# Patient Record
Sex: FEMALE | Race: WHITE | NOT HISPANIC OR LATINO | Employment: OTHER | URBAN - METROPOLITAN AREA
[De-identification: names, ages, dates, MRNs, and addresses within clinical notes are randomized per-mention and may not be internally consistent; named-entity substitution may affect disease eponyms.]

---

## 2017-01-17 NOTE — PATIENT DISCUSSION
Today's exam, diagnostic studies, and/or review of records show NO SIGNIFICANT CHANGE from previous exams.

## 2017-01-17 NOTE — PATIENT DISCUSSION
Post-op instructions given. Discussed drops, positioning, no strenuous activity and eye protection. Call immediately if eye pain or loss of vision.

## 2017-02-16 NOTE — PATIENT DISCUSSION
Recommended VITRECTOMY, endolaser, SILICONE OIL INJECTION. Discussed benefits, alternatives, and risks of surgery including (but not limited to) cataract (if patient has natural lens), infection, bleeding, redetachment, optic neuropathy, loss of vision, blindness, and loss of eye. Patient understands more than one operation may be needed to repair retinal detachment.

## 2017-02-17 NOTE — PATIENT DISCUSSION
Patient to start EYE DROPS in the operative eye; Pred- Forte/Prednisolone Acetate 1% & Antibiotic eye drops 4 TIMES A DAY FOR ONE WEEK, then reduce them to 2 TIMES A DAY FOR ONE WEEK, then stop the drops. Wait at least one minute between drops.

## 2017-04-11 NOTE — PATIENT DISCUSSION
Recommended VITRECTOMY, endolaser, FLUID/GAS EXCHANGE. Discussed benefits, alternatives, and risks of surgery including (but not limited to) cataract (if patient has natural lens), glaucoma, infection, bleeding, redetachment, optic neuropathy, loss of vision, blindness, and loss of eye. Patient understands more than one operation may be needed to repair retinal detachment.

## 2017-08-15 NOTE — PROCEDURE NOTE: CLINICAL
PROCEDURE NOTE: Avastin 1.25mg #1 OD. Diagnosis: Neovascular Glaucoma. Anesthesia: Topical/Subconjunctival. Prep: Betadine Drops. Prior to injection, risks/benefits/alternatives discussed including infection, loss of vision, hemorrhage, cataract, glaucoma, retinal tears or detachment. The off-label status of Intravitreal Avastin also was reviewed. The patient wished to proceed with treatment. Topical anesthesia was induced with Alcaine. Additional anesthesia was achieved using drop(s) or injection checked above. A drop of Povidone-iodine 5% ophthalmic solution was instilled over the injection site and in the inferior fornix. Betadine prep was performed. Using the syringe provided, Avastin 1.25 mg in 0.05 cc was injected into the vitreous cavity. The needle was passed 3.0 mm posterior to the limbus in pseudophakic patients, and 3.5 mm posterior to the limbus in phakic patients. Patient tolerated procedure well. There were no complications. Injection time: 9:59AM. Lot #: [email or web address]. Expiration Date: Wed Aug 23 2017 00:00:00 T-0400 Dillon Mathews Daylight Time). Inventory Id: null. Post-op instructions given. The patient was instructed to return for re-evaluation in approximately 4-12 weeks depending on his/her condition and was told to call immediately if vision decreases and/or if his/her eye becomes red, painful, and/or light sensitive. The patient was instructed to go to the emergency room or call 911 if unable to reach the doctor within an hour or two of trying or calling. The patient was instructed to use Artificial Tears q.i.d. p.r.n for comfort. Alberto Whalen

## 2017-09-22 NOTE — PROCEDURE NOTE: CLINICAL
PROCEDURE NOTE: Avastin 1.25mg OD. Diagnosis: Cystoid Macular Edema. Anesthesia: Topical/Subconjunctival. Prep: Betadine Drops. Prior to injection, risks/benefits/alternatives discussed including infection, loss of vision, hemorrhage, cataract, glaucoma, retinal tears or detachment. The off-label status of Intravitreal Avastin also was reviewed. The patient wished to proceed with treatment. Topical anesthesia was induced with Alcaine. Additional anesthesia was achieved using drop(s) or injection checked above. A drop of Povidone-iodine 5% ophthalmic solution was instilled over the injection site and in the inferior fornix. Betadine prep was performed. Using the syringe provided, Avastin 1.25 mg in 0.05 cc was injected into the vitreous cavity. The needle was passed 3.0 mm posterior to the limbus in pseudophakic patients, and 3.5 mm posterior to the limbus in phakic patients. Patient tolerated procedure well. There were no complications. Injection time: 9:05. Lot #: [email or web address]. Expiration Date: Sun Oct 29 2017 00:00:00 [facility]-0400 Dillon Emery Daylight Time). Inventory Id: null. Post-op instructions given. The patient was instructed to return for re-evaluation in approximately 4-12 weeks depending on his/her condition and was told to call immediately if vision decreases and/or if his/her eye becomes red, painful, and/or light sensitive. The patient was instructed to go to the emergency room or call 911 if unable to reach the doctor within an hour or two of trying or calling. The patient was instructed to use Artificial Tears q.i.d. p.r.n for comfort. Alberto Whalen

## 2017-11-21 NOTE — PROCEDURE NOTE: CLINICAL
PROCEDURE NOTE: Avastin 1.25mg OD. Diagnosis: Neovascular Glaucoma. Anesthesia: Topical/Subconjunctival. Prep: Betadine Drops. Prior to injection, risks/benefits/alternatives discussed including infection, loss of vision, hemorrhage, cataract, glaucoma, retinal tears or detachment. The off-label status of Intravitreal Avastin also was reviewed. The patient wished to proceed with treatment. Topical anesthesia was induced with Alcaine. Additional anesthesia was achieved using drop(s) or injection checked above. A drop of Povidone-iodine 5% ophthalmic solution was instilled over the injection site and in the inferior fornix. Betadine prep was performed. Using the syringe provided, Avastin 1.25 mg in 0.05 cc was injected into the vitreous cavity. The needle was passed 3.0 mm posterior to the limbus in pseudophakic patients, and 3.5 mm posterior to the limbus in phakic patients. Patient tolerated procedure well. There were no complications. Injection time: 9:17. Lot #: [email or web address]. Expiration Date: Sun Mar 01 2020 00:00:00 [facility]-0500 Rudy Reevesdy Standard Time). Inventory Id: null. Post-op instructions given. The patient was instructed to return for re-evaluation in approximately 4-12 weeks depending on his/her condition and was told to call immediately if vision decreases and/or if his/her eye becomes red, painful, and/or light sensitive. The patient was instructed to go to the emergency room or call 911 if unable to reach the doctor within an hour or two of trying or calling. The patient was instructed to use Artificial Tears q.i.d. p.r.n for comfort. Tera Alvarez

## 2018-01-25 NOTE — PATIENT DISCUSSION
WORSENED 1/25/18, RAN OUT OF DROPS. DIAMOX 500 MG NOW, PLUS DROPS. RECHECK 1 DAY AND PLAN AVASTIN 1.25 MG.

## 2018-01-26 NOTE — PROCEDURE NOTE: CLINICAL
PROCEDURE NOTE: Avastin 1.25mg OD. Diagnosis: Neovascular Glaucoma. Anesthesia: Topical. Prep: Betadine Drops. Prior to injection, risks/benefits/alternatives discussed including infection, loss of vision, hemorrhage, cataract, glaucoma, retinal tears or detachment. The off-label status of Intravitreal Avastin also was reviewed. The patient wished to proceed with treatment. Topical anesthesia was induced with Alcaine. Additional anesthesia was achieved using drop(s) or injection checked above. A drop of Povidone-iodine 5% ophthalmic solution was instilled over the injection site and in the inferior fornix. Betadine prep was performed. Using the syringe provided, Avastin 1.25 mg in 0.05 cc was injected into the vitreous cavity. The needle was passed 3.0 mm posterior to the limbus in pseudophakic patients, and 3.5 mm posterior to the limbus in phakic patients. Patient tolerated procedure well. There were no complications. Injection time: 133PM. Lot #: [email or web address]. Expiration Date: Sun Mar 04 2018 00:00:00 [facility]-0500 Urszula Raker Standard Time). Inventory Id: null. Post-op instructions given. The patient was instructed to return for re-evaluation in approximately 4-12 weeks depending on his/her condition and was told to call immediately if vision decreases and/or if his/her eye becomes red, painful, and/or light sensitive. The patient was instructed to go to the emergency room or call 911 if unable to reach the doctor within an hour or two of trying or calling. The patient was instructed to use Artificial Tears q.i.d. p.r.n for comfort. Kalry Luther

## 2018-03-02 NOTE — PROCEDURE NOTE: CLINICAL
PROCEDURE NOTE: Avastin 1.25mg OD. Diagnosis: Neovascular Glaucoma. Anesthesia: Topical/Subconjunctival. Prep: Betadine Drops. Prior to injection, risks/benefits/alternatives discussed including infection, loss of vision, hemorrhage, cataract, glaucoma, retinal tears or detachment. The off-label status of Intravitreal Avastin also was reviewed. The patient wished to proceed with treatment. Topical anesthesia was induced with Alcaine. Additional anesthesia was achieved using drop(s) or injection checked above. A drop of Povidone-iodine 5% ophthalmic solution was instilled over the injection site and in the inferior fornix. Betadine prep was performed. Using the syringe provided, Avastin 1.25 mg in 0.05 cc was injected into the vitreous cavity. The needle was passed 3.0 mm posterior to the limbus in pseudophakic patients, and 3.5 mm posterior to the limbus in phakic patients. Patient tolerated procedure well. There were no complications. Injection time: 8:46AM. Lot #: [email or web address]. Expiration Date: Mon Apr 02 2018 00:00:00 T-0400 Kamila [facility] Daylight Time). Inventory Id: null. Post-op instructions given. The patient was instructed to return for re-evaluation in approximately 4-12 weeks depending on his/her condition and was told to call immediately if vision decreases and/or if his/her eye becomes red, painful, and/or light sensitive. The patient was instructed to go to the emergency room or call 911 if unable to reach the doctor within an hour or two of trying or calling. The patient was instructed to use Artificial Tears q.i.d. p.r.n for comfort. Sujey Vaughan

## 2018-04-06 NOTE — PROCEDURE NOTE: CLINICAL
PROCEDURE NOTE: Avastin 1.25mg OD. Diagnosis: Neovascular Glaucoma. Anesthesia: Topical/Subconjunctival. Prep: Betadine Drops. Prior to injection, risks/benefits/alternatives discussed including infection, loss of vision, hemorrhage, cataract, glaucoma, retinal tears or detachment. The off-label status of Intravitreal Avastin also was reviewed. The patient wished to proceed with treatment. Topical anesthesia was induced with Alcaine. Additional anesthesia was achieved using drop(s) or injection checked above. A drop of Povidone-iodine 5% ophthalmic solution was instilled over the injection site and in the inferior fornix. Betadine prep was performed. Using the syringe provided, Avastin 1.25 mg in 0.05 cc was injected into the vitreous cavity. The needle was passed 3.0 mm posterior to the limbus in pseudophakic patients, and 3.5 mm posterior to the limbus in phakic patients. Patient tolerated procedure well. There were no complications. Injection time: *. Lot #: [email or web address]. Expiration Date: Sun May 06 2018 00:00:00 [facility]-0400 Marnava [facility] Daylight Time). Inventory Id: null. Post-op instructions given. The patient was instructed to return for re-evaluation in approximately 4-12 weeks depending on his/her condition and was told to call immediately if vision decreases and/or if his/her eye becomes red, painful, and/or light sensitive. The patient was instructed to go to the emergency room or call 911 if unable to reach the doctor within an hour or two of trying or calling. The patient was instructed to use Artificial Tears q.i.d. p.r.n for comfort. Moo Garcia

## 2018-06-01 NOTE — PROCEDURE NOTE: CLINICAL
PROCEDURE NOTE: Avastin 1.25mg OD. Diagnosis: Cystoid Macular Degeneration. Anesthesia: Akten Gel 3.5%. Prep: Betadine Drops. Prior to injection, risks/benefits/alternatives discussed including infection, loss of vision, hemorrhage, cataract, glaucoma, retinal tears or detachment. The off-label status of Intravitreal Avastin also was reviewed. The patient wished to proceed with treatment. Topical anesthesia was induced with Alcaine. Additional anesthesia was achieved using drop(s) or injection checked above. A drop of Povidone-iodine 5% ophthalmic solution was instilled over the injection site and in the inferior fornix. Betadine prep was performed. Using the syringe provided, Avastin 1.25 mg in 0.05 cc was injected into the vitreous cavity. The needle was passed 3.0 mm posterior to the limbus in pseudophakic patients, and 3.5 mm posterior to the limbus in phakic patients. Patient tolerated procedure well. There were no complications. Injection time: 9:17AM. Lot #: [email or web address]. Expiration Date: Thu Jun 28 2018 00:00:00 [facility]-0400 Ayanna Miranda Daylight Time). Inventory Id: null. Post-op instructions given. The patient was instructed to return for re-evaluation in approximately 4-12 weeks depending on his/her condition and was told to call immediately if vision decreases and/or if his/her eye becomes red, painful, and/or light sensitive. The patient was instructed to go to the emergency room or call 911 if unable to reach the doctor within an hour or two of trying or calling. The patient was instructed to use Artificial Tears q.i.d. p.r.n for comfort. Ruben King

## 2018-07-06 NOTE — PROCEDURE NOTE: CLINICAL
PROCEDURE NOTE: Avastin 1.25mg OD. Diagnosis: Cystoid Macular Degeneration. Anesthesia: Topical. Prep: Betadine Drops. Prior to injection, risks/benefits/alternatives discussed including infection, loss of vision, hemorrhage, cataract, glaucoma, retinal tears or detachment. The off-label status of Intravitreal Avastin also was reviewed. The patient wished to proceed with treatment. Topical anesthesia was induced with Alcaine. Additional anesthesia was achieved using drop(s) or injection checked above. A drop of Povidone-iodine 5% ophthalmic solution was instilled over the injection site and in the inferior fornix. Betadine prep was performed. Using the syringe provided, Avastin 1.25 mg in 0.05 cc was injected into the vitreous cavity. The needle was passed 3.0 mm posterior to the limbus in pseudophakic patients, and 3.5 mm posterior to the limbus in phakic patients. Patient tolerated procedure well. There were no complications. Injection time: 915A. Lot #: [email or web address]. Expiration Date: Thu Aug 02 2018 00:00:00 [facility]-0400 Jazzjonas Thompson Daylight Time). Inventory Id: null. Post-op instructions given. The patient was instructed to return for re-evaluation in approximately 4-12 weeks depending on his/her condition and was told to call immediately if vision decreases and/or if his/her eye becomes red, painful, and/or light sensitive. The patient was instructed to go to the emergency room or call 911 if unable to reach the doctor within an hour or two of trying or calling. The patient was instructed to use Artificial Tears q.i.d. p.r.n for comfort. Tera Alvarez

## 2018-08-17 NOTE — PROCEDURE NOTE: CLINICAL
PROCEDURE NOTE: Avastin 1.25mg OD. Diagnosis: Cystoid Macular Degeneration. Anesthesia: Topical. Prep: Betadine Drops. Prior to injection, risks/benefits/alternatives discussed including infection, loss of vision, hemorrhage, cataract, glaucoma, retinal tears or detachment. The off-label status of Intravitreal Avastin also was reviewed. The patient wished to proceed with treatment. Topical anesthesia was induced with Alcaine. Additional anesthesia was achieved using drop(s) or injection checked above. A drop of Povidone-iodine 5% ophthalmic solution was instilled over the injection site and in the inferior fornix. Betadine prep was performed. Using the syringe provided, Avastin 1.25 mg in 0.05 cc was injected into the vitreous cavity. The needle was passed 3.0 mm posterior to the limbus in pseudophakic patients, and 3.5 mm posterior to the limbus in phakic patients. Patient tolerated procedure well. There were no complications. Injection time: 9:29AM. Lot #: [email or web address]. Expiration Date: Thu Sep 20 2018 00:00:00 [facility]-0400 [place] Daylight Time). Inventory Id: null. Post-op instructions given. The patient was instructed to return for re-evaluation in approximately 4-12 weeks depending on his/her condition and was told to call immediately if vision decreases and/or if his/her eye becomes red, painful, and/or light sensitive. The patient was instructed to go to the emergency room or call 911 if unable to reach the doctor within an hour or two of trying or calling. The patient was instructed to use Artificial Tears q.i.d. p.r.n for comfort. Clifford Corcoran

## 2018-08-17 NOTE — PATIENT DISCUSSION
----- Message from Christina Duarte MD sent at 7/17/2017  5:28 PM CDT -----  Please adv pt that his chest X-ray showed normal results. No problem with the lungs that would give him SOB>   BMI Within normal limits, continue current management.

## 2018-09-25 NOTE — PATIENT DISCUSSION
IOP within reasonable range TODAY. UROLOGY [place]  4 24 18    c-c here for cysto    Age 83, comes in today for urologic f/u after his hx of bladder ca.   Has hx of turbt of a 4 cm growth on the R lateral wall of the bladder in august 2017. Path report described high grade papillary urothelial carcinoma invading the subepithelial connective tissue (lamina propria), but not invading the muscle layer. Pt did well after his turbt. His follow up cystoscopy again showed a recurrence, this time 3 x 3 cm in size in R lateral wall, and also a bladder stone 3 cm in size made up of soft material. The stone and the second tumor were removed in jan 2017. The path report again described malignancy, as 'focal transitional cell carcinoma in situ', with no invasive malignancy. Extensive dystrophic calcification noted within the muscular bladder wall.      In addition to the bladder cancer history, pt has a previous hx of high grade prostate cancer that has spread outside of the gland. Pt has done well with hormonal treatment for his prostate cancer, which he gets intermittently. He initially received 3 years of LHRH, for a total of 6 injections of trelstar 22.5 mg every 6 months. After that he has been getting the hormonal treatment intermittently.      Has nocturia x 1-2, no significant daytime frequency, no urgency or incontinence. No pains or burning. Stream is slightly diminished but no need to strain.      His psa was 12 on presentation. It was 0.13 two months ago, 0.10 one month ago, and 0.09 three weeks ago.      He has been bothered with constipation and fecal impaction, and has needed to take a number of medication for it, including magnesium citrate and miralax. He is well now from that.     CYSTOSCOPY [facility]. Anterior urethra normal. Posterior urethra 4 cm, with no evident obstruction from his prostate. Bladder cavity distends equally and symmetrically when filled with water. There are some areas of scarring, revealed by stellar shaped patches  on the R side of the bladder, with no evidence of recurrence. No exophytic growths are seen, no suspicious erythematous areas, no ulcerations. No bleeding points. There is mild trabeculation and some cellulae formation. There is also a diverticulum on the R lateral wall, and we are able to inspect within, and there are no lesions there. Pt tolerated the procedure well.    BLADDERSCAN  155  Ml residual urine        IMP  Bladder ca, s/p resection x 2  Bladder stone, s/p removal 3 months ago  Chronic constipation, on proper treatment at this time  Prostate ca, in intermittent hormonal ablation. Pt should get psa checks every 6 months, not every few weeks as he is doing currently.     RTC 3 mo for cysto, will shift to q 6 mo cysto at that point if cysto is negative

## 2019-02-22 NOTE — PROCEDURE NOTE: CLINICAL
PROCEDURE NOTE: Avastin 1.25mg OD. Diagnosis: Neovascular Glaucoma. Anesthesia: Lidocaine 4%. Prep: Betadine Drops. Prior to injection, risks/benefits/alternatives discussed including infection, loss of vision, hemorrhage, cataract, glaucoma, retinal tears or detachment. The off-label status of Intravitreal Avastin also was reviewed. The patient wished to proceed with treatment. Topical anesthesia was induced with Alcaine. Additional anesthesia was achieved using drop(s) or injection checked above. A drop of Povidone-iodine 5% ophthalmic solution was instilled over the injection site and in the inferior fornix. Betadine prep was performed. Using the syringe provided, Avastin 1.25 mg in 0.05 cc was injected into the vitreous cavity. The needle was passed 3.0 mm posterior to the limbus in pseudophakic patients, and 3.5 mm posterior to the limbus in phakic patients. Patient tolerated procedure well. There were no complications. Injection time: 6984. Lot #: [email or web address]. Expiration Date: Mon Apr 01 2019 00:00:00 [facility]0400 [facility] Daylight [facility]). Post-op instructions given. Inventory Id: null. The patient was instructed to return for re-evaluation in approximately 4-12 weeks depending on his/her condition and was told to call immediately if vision decreases and/or if his/her eye becomes red, painful, and/or light sensitive. The patient was instructed to go to the emergency room or call 911 if unable to reach the doctor within an hour or two of trying or calling. The patient was instructed to use Artificial Tears q.i.d. p.r.n for comfort. Yessy Dozier

## 2019-04-05 NOTE — PROCEDURE NOTE: CLINICAL
PROCEDURE NOTE: Avastin 1.25mg OD. Diagnosis: Cystoid Macular Degeneration. Anesthesia: Lidocaine 4%. Prep: Betadine Drops. Prior to injection, risks/benefits/alternatives discussed including infection, loss of vision, hemorrhage, cataract, glaucoma, retinal tears or detachment. The off-label status of Intravitreal Avastin also was reviewed. The patient wished to proceed with treatment. Topical anesthesia was induced with Alcaine. Additional anesthesia was achieved using drop(s) or injection checked above. A drop of Povidone-iodine 5% ophthalmic solution was instilled over the injection site and in the inferior fornix. Betadine prep was performed. Using the syringe provided, Avastin 1.25 mg in 0.05 cc was injected into the vitreous cavity. The needle was passed 3.0 mm posterior to the limbus in pseudophakic patients, and 3.5 mm posterior to the limbus in phakic patients. Patient tolerated procedure well. There were no complications. Injection time: 8:43am. Lot #: [email or web address]. Expiration Date: Wed May 22 2019 00:00:00 [facility]0400 [facility] Daylight Time). Post-op instructions given. Inventory Id: null. The patient was instructed to return for re-evaluation in approximately 4-12 weeks depending on his/her condition and was told to call immediately if vision decreases and/or if his/her eye becomes red, painful, and/or light sensitive. The patient was instructed to go to the emergency room or call 911 if unable to reach the doctor within an hour or two of trying or calling. The patient was instructed to use Artificial Tears q.i.d. p.r.n for comfort. Clifford Corcoran

## 2019-06-05 ENCOUNTER — PREPPED CHART (OUTPATIENT)
Dept: URBAN - METROPOLITAN AREA CLINIC 25 | Facility: CLINIC | Age: 59
End: 2019-06-05

## 2019-06-18 NOTE — PROCEDURE NOTE: CLINICAL
PROCEDURE NOTE: Avastin 1.25mg OD. Diagnosis: Neovascular Glaucoma. Anesthesia: Lidocaine 4%. Prep: Betadine Drops. Prior to injection, risks/benefits/alternatives discussed including infection, loss of vision, hemorrhage, cataract, glaucoma, retinal tears or detachment. The off-label status of Intravitreal Avastin also was reviewed. The patient wished to proceed with treatment. Topical anesthesia was induced with Alcaine. Additional anesthesia was achieved using drop(s) or injection checked above. A drop of Povidone-iodine 5% ophthalmic solution was instilled over the injection site and in the inferior fornix. Betadine prep was performed. Using the syringe provided, Avastin 1.25 mg in 0.05 cc was injected into the vitreous cavity. The needle was passed 3.0 mm posterior to the limbus in pseudophakic patients, and 3.5 mm posterior to the limbus in phakic patients. Patient tolerated procedure well. There were no complications. Injection time: 9:20AM. Lot #: [email or web address]. Expiration Date: Thu Sep 12 2019 00:00:00 [place] [id number] [place] Daylight Time). Post-op instructions given. Inventory Id: null. The patient was instructed to return for re-evaluation in approximately 4-12 weeks depending on his/her condition and was told to call immediately if vision decreases and/or if his/her eye becomes red, painful, and/or light sensitive. The patient was instructed to go to the emergency room or call 911 if unable to reach the doctor within an hour or two of trying or calling. The patient was instructed to use Artificial Tears q.i.d. p.r.n for comfort. Jorge A Dent

## 2019-07-05 NOTE — PATIENT DISCUSSION
Patient verified .  Reminder call for stress test with instructions given.  Patient verbalized understanding.    Recommended VITRECTOMY, endolaser, SILICONE OIL INJECTION. Discussed benefits, alternatives, and risks of surgery including (but not limited to) cataract (if patient has natural lens), infection, bleeding, redetachment, optic neuropathy, loss of vision, blindness, and loss of eye. Patient understands more than one operation may be needed to repair retinal detachment.

## 2019-07-19 NOTE — PATIENT DISCUSSION
Recommended VITRECTOMY, endolaser, SILICONE OIL REMOVAL/ INJECTION. Discussed benefits, alternatives, and risks of surgery including (but not limited to) cataract (if patient has natural lens), infection, bleeding, redetachment, optic neuropathy, loss of vision, blindness, and loss of eye. Patient understands more than one operation may be needed to repair retinal detachment.

## 2019-07-19 NOTE — PROCEDURE NOTE: CLINICAL
PROCEDURE NOTE: Avastin 1.25mg OD. Diagnosis: Neovascular Glaucoma. Anesthesia: Lidocaine 4%. Prep: Betadine Drops. Prior to injection, risks/benefits/alternatives discussed including infection, loss of vision, hemorrhage, cataract, glaucoma, retinal tears or detachment. The off-label status of Intravitreal Avastin also was reviewed. The patient wished to proceed with treatment. Topical anesthesia was induced with Alcaine. Additional anesthesia was achieved using drop(s) or injection checked above. A drop of Povidone-iodine 5% ophthalmic solution was instilled over the injection site and in the inferior fornix. Betadine prep was performed. Using the syringe provided, Avastin 1.25 mg in 0.05 cc was injected into the vitreous cavity. The needle was passed 3.0 mm posterior to the limbus in pseudophakic patients, and 3.5 mm posterior to the limbus in phakic patients. Patient tolerated procedure well. There were no complications. Injection time: 10:00 AM. Lot #: [email or web address]. Expiration Date: Sat Oct 05 2019 00:00:00 [facility]). Post-op instructions given. Inventory Id: null. The patient was instructed to return for re-evaluation in approximately 4-12 weeks depending on his/her condition and was told to call immediately if vision decreases and/or if his/her eye becomes red, painful, and/or light sensitive. The patient was instructed to go to the emergency room or call 911 if unable to reach the doctor within an hour or two of trying or calling. The patient was instructed to use Artificial Tears q.i.d. p.r.n for comfort. Yessy Dozier

## 2020-02-07 NOTE — PATIENT DISCUSSION
My findings and recommendations TODAY are based on the patient's symptoms, exam, diagnostic testing and records. SW:    I: DANA following. DANA received call from Scotty at [facility] this morning who shared that [facility] is requesting a peer to peer review before determining whether they will cover TCU. Suzy Chacon contacted [facility] peer to peer and unfortunately even after conversation about the need, [facility] has denied pt authorization for TCU. Pt will be discharged home with home care when medically ready. Pt is not appropriate for discharge today.     DANA updated Scotty of [facility]'s decision and he has canceled plan for admission.  Health transport canceled.    NICANOR Gonzalez, LICSW  [facility]  Daytime (8:00am-4:30pm): [phone]  After-Hours DANA Pager (4:30pm-11:30pm): [phone]

## 2020-02-25 NOTE — PATIENT DISCUSSION
2/24/2020, 7:55 PM   History obtained from the patient      History of Present Illness: Sherrill Avery is a 69 y.o. female patient presenting for sob.    7:56 PM: Pt was placed back in [facility]. I explained to pt that due to lack of available rooms pt was seen by myself to begin the workup. Pt understands they will be seen and dispositioned by the next available physician. Pt voices understanding and agrees with plan. Pt also understands the longer than normal wait time. I am removing myself from the care of pt and pt will now be assigned to the next available physician.     SCRIBE #1 NOTE: I, Devin Chapman, am scribing for, and in the presence of, Dona Salgado MD. I have scribed the entire note.      History      Chief Complaint   Patient presents with    Shortness of Breath     patient reports worsening shortness of breath       Review of patient's allergies indicates:   Allergen Reactions    Talwin compound Anxiety     hallucinations/anxiety    Tramadol Itching    Codeine Itching    Morphine Itching    Naproxen Itching    Omeprazole Other (See Comments)     Abdominal bloating    Wal-phed [pseudoephedrine hcl] Itching    Zithromax [azithromycin]     Buspirone Anxiety    Morpholine analogues Anxiety and Itching    Nexium [esomeprazole magnesium]     Talwin [pentazocine lactate] Anxiety        HPI   HPI    2/24/2020, 9:14 PM   History obtained from the patient      History of Present Illness: Sherrill Avery is a 69 y.o. female patient with a PMHx of renal cancer with metastasis to the lung s/p surgical thoracoscopy with therapeutic wedge resection on 1/29/20 and who presents to the Emergency Department for worsening SOB, onset 1 day PTA. Symptoms are constant and moderate in severity. No mitigating or exacerbating factors reported. Associated sxs include chest tightness and dry cough x 1 week. Patient denies any fever, chills, n/v/d, CP, weakness, numbness, dizzines, and all other sxs at this time. No  Today's exam, diagnostic studies, and/or review of records show NO SIGNIFICANT CHANGE from previous exams. prior Tx reported. No further complaints or concerns at this time.       Arrival mode: Personal vehicle    PCP: Alexandre Macias MD       Past Medical History:  Past Medical History:   Diagnosis Date    Anxiety     Arthritis     Cancer of kidney 8/2/2013    dr faye    Ectopic pregnancy     Eye infection     GERD (gastroesophageal reflux disease)     Hiatal hernia     History of kidney cancer     Hypercholesteremia     Hypertension     Hypothyroid     dr block q 6 months    Insomnia     Migraine headache     Renal cell cancer     Respiratory arrest     due to anesthia    Rosacea     Sleep apnea     Thyroid nodule     dr block    Urinary tract infection      Past Surgical History:  Past Surgical History:   Procedure Laterality Date    AUGMENTATION OF BREAST      breast implants      BREAST SURGERY Bilateral 1996    saline implants    COLONOSCOPY  2007    HYSTERECTOMY      ectopic pregnancy x 2, with LSO    LAPAROSCOPIC NEPHRECTOMY, HAND ASSISTED  07/25/2013    NEPHRECTOMY      OOPHORECTOMY      x1    right ganglion cyst      throat polyp      TRANSOBTURATOR SLING  2011    with RSO for persistent complex cyst & MARGOT; [facility] by [facility]    UPPER GASTROINTESTINAL ENDOSCOPY  2007         Family History:  Family History   Problem Relation Age of Onset    Diabetes Mother     Hypertension Mother     Heart disease Mother     Cancer Father         lung    Migraines Father     Glaucoma Paternal Grandmother     Glaucoma Sister     Thyroid disease Neg Hx     Asthma Neg Hx        Social History:  Social History     Tobacco Use    Smoking status: Never Smoker    Smokeless tobacco: Never Used   Substance and Sexual Activity    Alcohol use: Yes     Alcohol/week: 0.0 standard drinks     Comment: social    Drug use: No    Sexual activity: Yes     Partners: Male     Birth control/protection: Surgical       ROS   Review of Systems   Constitutional: Negative for chills, diaphoresis, fatigue and  "fever.   HENT: Negative for sore throat.    Respiratory: Positive for cough (dry), chest tightness and shortness of breath.    Cardiovascular: Negative for chest pain.   Gastrointestinal: Negative for diarrhea, nausea and vomiting.   Genitourinary: Negative for dysuria.   Musculoskeletal: Negative for back pain.   Skin: Negative for rash.   Neurological: Negative for dizziness, weakness, light-headedness, numbness and headaches.   Hematological: Does not bruise/bleed easily.   All other systems reviewed and are negative.    Physical Exam      Initial Vitals [02/24/20 1955]   BP Pulse Resp Temp SpO2   (!) 148/83 79 20 98.6 °F (37 °C) 98 %      MAP       --          Physical Exam  Nursing Notes and Vital Signs Reviewed.  Constitutional: Patient is in no acute distress. Well-developed and well-nourished.  Head: Atraumatic. Normocephalic.  Eyes: PERRL. EOM intact. Conjunctivae are not pale. No scleral icterus.  ENT: Mucous membranes are moist. Oropharynx is clear and symmetric.    Neck: Supple. Full ROM. No lymphadenopathy.  Cardiovascular: Regular rate. Regular rhythm. No murmurs, rubs, or gallops. Distal pulses are 2+ and symmetric.  Pulmonary/Chest: No respiratory distress. Clear to auscultation bilaterally. No wheezing or rales.  Abdominal: Soft and non-distended.  There is no tenderness.  No rebound, guarding, or rigidity.   Musculoskeletal: Moves all extremities. No obvious deformities. No edema.  Skin: Warm and dry.  Neurological:  Alert, awake, and appropriate.  Normal speech.  No acute focal neurological deficits are appreciated.  Psychiatric: Normal affect. Good eye contact. Appropriate in content.    ED Course    Procedures  ED Vital Signs:  Vitals:    02/24/20 1955 02/24/20 2103 02/24/20 2109 02/24/20 2146   BP: (!) 148/83 (!) 144/72  (!) 128/59   Pulse: 79 67 64 66   Resp: 20 12  19   Temp: 98.6 °F (37 °C)      TempSrc: Oral      SpO2: 98% 100%  97%   Weight: 89.2 kg (196 lb 10.4 oz)      Height: 5' 5.5" " (1.664 m)       02/24/20 2237 02/24/20 2309   BP:  138/78   Pulse: 69 79   Resp: 18 18   Temp:  98 °F (36.7 °C)   TempSrc:  Oral   SpO2:  97%   Weight:     Height:         Abnormal Lab Results:  Labs Reviewed   CBC W/ AUTO DIFFERENTIAL - Abnormal; Notable for the following components:       Result Value    Mean Corpuscular Hemoglobin Conc 31.5 (*)     Eos # 0.6 (*)     All other components within normal limits   COMPREHENSIVE METABOLIC PANEL - Abnormal; Notable for the following components:    ALT 9 (*)     Anion Gap 7 (*)     eGFR if  59 (*)     eGFR if non  51 (*)     All other components within normal limits   HEPATITIS C ANTIBODY   TROPONIN I   B-TYPE NATRIURETIC PEPTIDE        All Lab Results:  Results for orders placed or performed during the hospital encounter of 02/24/20   Hepatitis C antibody   Result Value Ref Range    Hepatitis C Ab Negative Negative   CBC auto differential   Result Value Ref Range    WBC 7.85 3.90 - 12.70 K/uL    RBC 4.32 4.00 - 5.40 M/uL    Hemoglobin 12.6 12.0 - 16.0 g/dL    Hematocrit 40.0 37.0 - 48.5 %    Mean Corpuscular Volume 93 82 - 98 fL    Mean Corpuscular Hemoglobin 29.2 27.0 - 31.0 pg    Mean Corpuscular Hemoglobin Conc 31.5 (L) 32.0 - 36.0 g/dL    RDW 13.6 11.5 - 14.5 %    Platelets 259 150 - 350 K/uL    MPV 9.7 9.2 - 12.9 fL    Immature Granulocytes 0.3 0.0 - 0.5 %    Gran # (ANC) 4.1 1.8 - 7.7 K/uL    Immature Grans (Abs) 0.02 0.00 - 0.04 K/uL    Lymph # 2.7 1.0 - 4.8 K/uL    Mono # 0.5 0.3 - 1.0 K/uL    Eos # 0.6 (H) 0.0 - 0.5 K/uL    Baso # 0.04 0.00 - 0.20 K/uL    nRBC 0 0 /100 WBC    Gran% 51.7 38.0 - 73.0 %    Lymph% 33.9 18.0 - 48.0 %    Mono% 6.6 4.0 - 15.0 %    Eosinophil% 7.0 0.0 - 8.0 %    Basophil% 0.5 0.0 - 1.9 %    Differential Method Automated    Comprehensive metabolic panel   Result Value Ref Range    Sodium 139 136 - 145 mmol/L    Potassium 4.7 3.5 - 5.1 mmol/L    Chloride 106 95 - 110 mmol/L    CO2 26 23 - 29 mmol/L     Glucose 97 70 - 110 mg/dL    BUN, Bld 13 8 - 23 mg/dL    Creatinine 1.1 0.5 - 1.4 mg/dL    Calcium 8.8 8.7 - 10.5 mg/dL    Total Protein 6.5 6.0 - 8.4 g/dL    Albumin 3.6 3.5 - 5.2 g/dL    Total Bilirubin 0.2 0.1 - 1.0 mg/dL    Alkaline Phosphatase 71 55 - 135 U/L    AST 15 10 - 40 U/L    ALT 9 (L) 10 - 44 U/L    Anion Gap 7 (L) 8 - 16 mmol/L    eGFR if African American 59 (A) >60 mL/min/1.73 m^2    eGFR if non African American 51 (A) >60 mL/min/1.73 m^2   Troponin I   Result Value Ref Range    Troponin I <0.006 0.000 - 0.026 ng/mL   Brain natriuretic peptide   Result Value Ref Range    BNP 16 0 - 99 pg/mL     Imaging Results:  Imaging Results          X-Ray Chest PA And Lateral (Final result)  Result time 02/24/20 20:29:03    Final result by Alexandre Ahn MD (02/24/20 20:29:03)                 Impression:      1.  Significant improvement without complete resolution of left pleural effusion and posterior inferior left lower lobe opacity with postoperative changes.    2.  Stable findings as noted above.  Negative for acute process.      Electronically signed by: Alexandre Ahn MD  Date:    02/24/2020  Time:    20:29             Narrative:    EXAMINATION:  XR CHEST PA AND LATERAL    CLINICAL HISTORY:  Shortness of breath    COMPARISON:  Studies dating back to April 25, 2017. comparisons are also made to a chest CT scan from February 7, 2020.    FINDINGS:  The left pleural effusion and adjacent opacity within the posterior right lower lobe has significantly improved without completely resolving.  Stable postoperative changes in the adjacent lung.  The lungs are otherwise clear.  The cardiac silhouette size is normal. The trachea is midline and the mediastinal width is normal. Negative for right effusion or pneumothorax.  Pulmonary vasculature is normal. Negative for osseous abnormalities. Ectatic and tortuous aorta.  Calcifications of the aortic knob.  Degenerative changes of the spine and both shoulder girdles with  mild convex left curvature.                               The EKG was ordered, reviewed, and independently interpreted by the ED provider.  Interpretation time: 20:59  Rate: 62 BPM  Rhythm: normal sinus rhythm  Interpretation: Cannot rule out anterior infarct, age undetermined. No STEMI.           The Emergency Provider reviewed the vital signs and test results, which are outlined above.    ED Discussion     10:59 PM: Reassessed pt at this time. Discussed with pt all pertinent ED information and results. Discussed pt dx and plan of tx. Gave pt all f/u and return to the ED instructions. All questions and concerns were addressed at this time. Pt expresses understanding of information and instructions, and is comfortable with plan to discharge. Pt is stable for discharge.    I discussed with patient and/or family/caretaker that evaluation in the ED does not suggest any emergent or life threatening medical conditions requiring immediate intervention beyond what was provided in the ED, and I believe patient is safe for discharge.  Regardless, an unremarkable evaluation in the ED does not preclude the development or presence of a serious of life threatening condition. As such, patient was instructed to return immediately for any worsening or change in current symptoms.         ED Medication(s):  Medications   albuterol-ipratropium 2.5 mg-0.5 mg/3 mL nebulizer solution 3 mL (3 mLs Nebulization Given 2/24/20 2237)       Follow-up Information     Schedule an appointment as soon as possible for a visit  with Alexandre Macias MD.    Specialty:  Family Medicine  Why:  As needed  Contact information:  [address]  [place] [address]  [phone]                     Medical Decision Making    Medical Decision Making:   Clinical Tests:   Lab Tests: Ordered and Reviewed  Radiological Study: Ordered and Reviewed  Medical Tests: Ordered and Reviewed           Scribe Attestation:   Scribe #1: I performed the above  scribed service and the documentation accurately describes the services I performed. I attest to the accuracy of the note.    Attending:   Physician Attestation Statement for Scribe #1: I, Dona Salgado MD, personally performed the services described in this documentation, as scribed by Devin Chapman, in my presence, and it is both accurate and complete.          Clinical Impression       ICD-10-CM ICD-9-CM   1. Shortness of breath R06.02 786.05       Disposition:   Disposition: Discharged  Condition: Stable                     Dona Salgado MD  02/25/20 9549

## 2020-03-18 NOTE — PATIENT DISCUSSION
No new tear/breaks/detachment seen TODAY. Consent (Nose)/Introductory Paragraph: The rationale for Mohs was explained to the patient and consent was obtained. The risks, benefits and alternatives to therapy were discussed in detail. Specifically, the risks of nasal deformity, changes in the flow of air through the nose, infection, scarring, bleeding, prolonged wound healing, incomplete removal, allergy to anesthesia, nerve injury and recurrence were addressed. Prior to the procedure, the treatment site was clearly identified and confirmed by the patient. All components of Universal Protocol/PAUSE Rule completed.

## 2021-06-28 ASSESSMENT — TONOMETRY
OS_IOP_MMHG: 12
OD_IOP_MMHG: 11

## 2021-06-30 ENCOUNTER — ESTABLISHED COMPREHENSIVE EXAM (OUTPATIENT)
Dept: URBAN - METROPOLITAN AREA CLINIC 25 | Facility: CLINIC | Age: 61
End: 2021-06-30

## 2021-06-30 VITALS — HEIGHT: 55 IN

## 2021-06-30 DIAGNOSIS — H52.223: ICD-10-CM

## 2021-06-30 DIAGNOSIS — Z96.1: ICD-10-CM

## 2021-06-30 PROCEDURE — 92014 COMPRE OPH EXAM EST PT 1/>: CPT

## 2021-06-30 PROCEDURE — 92015 DETERMINE REFRACTIVE STATE: CPT

## 2021-06-30 ASSESSMENT — VISUAL ACUITY
OS_SC: 20/25
OD_SC: 20/20

## 2021-12-29 NOTE — PATIENT DISCUSSION
BMI Within normal limits, continue current management. [Alert] : alert [No Acute Distress] : no acute distress [Normocephalic] : normocephalic [EOMI Bilateral] : EOMI bilateral [Clear tympanic membranes with bony landmarks and light reflex present bilaterally] : clear tympanic membranes with bony landmarks and light reflex present bilaterally  [Pink Nasal Mucosa] : pink nasal mucosa [Nonerythematous Oropharynx] : nonerythematous oropharynx [Supple, full passive range of motion] : supple, full passive range of motion [No Palpable Masses] : no palpable masses [Clear to Auscultation Bilaterally] : clear to auscultation bilaterally [Regular Rate and Rhythm] : regular rate and rhythm [Normal S1, S2 audible] : normal S1, S2 audible [No Murmurs] : no murmurs [+2 Femoral Pulses] : +2 femoral pulses [Soft] : soft [NonTender] : non tender [Non Distended] : non distended [Normoactive Bowel Sounds] : normoactive bowel sounds [No Hepatomegaly] : no hepatomegaly [No Splenomegaly] : no splenomegaly [Xiang: _____] : Xiang [unfilled] [Bilateral descended testes] : bilateral descended testes [No Testicular Masses] : no testicular masses [No Abnormal Lymph Nodes Palpated] : no abnormal lymph nodes palpated [Normal Muscle Tone] : normal muscle tone [No Gait Asymmetry] : no gait asymmetry [No pain or deformities with palpation of bone, muscles, joints] : no pain or deformities with palpation of bone, muscles, joints [Straight] : straight [+2 Patella DTR] : +2 patella DTR [Cranial Nerves Grossly Intact] : cranial nerves grossly intact [No Rash or Lesions] : no rash or lesions

## 2022-07-27 ENCOUNTER — ESTABLISHED PATIENT (OUTPATIENT)
Dept: URBAN - METROPOLITAN AREA CLINIC 25 | Facility: CLINIC | Age: 62
End: 2022-07-27

## 2022-07-27 DIAGNOSIS — H52.223: ICD-10-CM

## 2022-07-27 DIAGNOSIS — Z96.1: ICD-10-CM

## 2022-07-27 PROCEDURE — 92015 DETERMINE REFRACTIVE STATE: CPT

## 2022-07-27 PROCEDURE — 92499RSE RETINAL SCREENING ELECTIVE

## 2022-07-27 PROCEDURE — 92014 COMPRE OPH EXAM EST PT 1/>: CPT

## 2022-07-27 ASSESSMENT — VISUAL ACUITY
OD_SC: 20/20-2
OS_SC: 20/20

## 2022-07-27 ASSESSMENT — TONOMETRY
OD_IOP_MMHG: 11
OS_IOP_MMHG: 13

## 2022-07-27 ASSESSMENT — KERATOMETRY
OS_AXISANGLE_DEGREES: 180
OS_K1POWER_DIOPTERS: 44.75
OD_AXISANGLE2_DEGREES: 9
OD_AXISANGLE_DEGREES: 99
OD_K1POWER_DIOPTERS: 44.50
OS_AXISANGLE2_DEGREES: 90
OD_K2POWER_DIOPTERS: 45.00
OS_K2POWER_DIOPTERS: 44.75

## 2022-07-30 ENCOUNTER — TELEPHONE ENCOUNTER (OUTPATIENT)
Age: 62
End: 2022-07-30

## 2022-07-31 ENCOUNTER — TELEPHONE ENCOUNTER (OUTPATIENT)
Age: 62
End: 2022-07-31

## 2022-10-22 NOTE — PATIENT DISCUSSION
"[facility]  ED Nurse Handoff Report    ED Chief complaint: Urgency      ED Diagnosis:   Final diagnoses:   None       Code Status: admitting physician to determine    Allergies:   Allergies   Allergen Reactions     Sulfa Drugs Rash       Patient Story: Sept 30 here had suh placed for retention, ,took  out here ,  followed with urologist a week later, cath changed at that time and told he would have it for a month  , UTI 3 courses of cephalexin, still has sx, nothing bag during the night, changed to leg bag, didn't sleep, urgency feeling all night , now having urine in leg bag , had 4 \" forced\" BM today because hx constipation with the retention. Better now that urine is coming out .     Focused Assessment:  Patient is alert and oriented    Treatments and/or interventions provided:   Labs Ordered and Resulted from Time of ED Arrival to Time of ED Departure   BASIC METABOLIC PANEL - Abnormal       Result Value    Sodium 132 (*)     Potassium 4.4      Chloride 100      Carbon Dioxide (CO2) 27      Anion Gap 5      Urea Nitrogen 35 (*)     Creatinine 1.27 (*)     Calcium 9.3      Glucose 203 (*)     GFR Estimate 60 (*)    ROUTINE UA WITH MICROSCOPIC REFLEX TO CULTURE - Abnormal    Color Urine Dark Brown (*)     Appearance Urine Cloudy (*)     Glucose Urine 50 (*)     Bilirubin Urine Negative      Ketones Urine Negative      Specific Gravity Urine 1.011      Blood Urine Moderate (*)     pH Urine 6.0      Protein Albumin Urine 200 (*)     Urobilinogen Urine Normal      Nitrite Urine Negative      Leukocyte Esterase Urine Large (*)     WBC Clumps Urine Present (*)     RBC Urine 23 (*)     WBC Urine >182 (*)    CBC WITH PLATELETS AND DIFFERENTIAL - Abnormal    WBC Count 24.6 (*)     RBC Count 3.61 (*)     Hemoglobin 10.2 (*)     Hematocrit 30.8 (*)     MCV 85      MCH 28.3      MCHC 33.1      RDW 16.0 (*)     Platelet Count 403      % Neutrophils 87      % Lymphocytes 8      % Monocytes 4      % " Recommend OBSERVATION and continued MONITORING for progression. Eosinophils 0      % Basophils 0      % Immature Granulocytes 1      NRBCs per 100 WBC 0      Absolute Neutrophils 21.6 (*)     Absolute Lymphocytes 1.9      Absolute Monocytes 0.9      Absolute Eosinophils 0.0      Absolute Basophils 0.1      Absolute Immature Granulocytes 0.2      Absolute NRBCs 0.0     URINE CULTURE     No orders to display       Patient's response to treatments and/or interventions: very plesant    To be done/followed up on inpatient unit:      Does this patient have any cognitive concerns?: NA    Activity level - Baseline/Home:  Independent  Activity Level - Current:   Independent    Patient's Preferred language: English   Needed?: No    Isolation: None  Infection: Not Applicable  Patient tested for COVID 19 prior to admission: YES  Bariatric?: No    Vital Signs:   Vitals:    10/22/22 0856   BP: 112/49   Pulse: 97   Resp: 16   Temp: 99.1  F (37.3  C)   TempSrc: Temporal   SpO2: 98%       Cardiac Rhythm:     Was the PSS-3 completed:   Yes  What interventions are required if any?                   For the majority of the shift this patient's behavior was Green.   Behavioral interventions performed were NA.    ED NURSE PHONE NUMBER: *[phone]

## 2022-10-24 NOTE — PATIENT DISCUSSION
11/9/17 USING ALPHAGAN OD AND LUMIGAN . Writer advised patient that they need to return call to pre-service to discuss approval and if co-pay assistance is needed.  If patient does start the medication, writer advised that [facility] does offer injection training at home.  Otherwise, we can do injection training in the office with nurse visit.  Lastly, writer advised patient that once medication is started, a cbc & cmp will be due in 1 month then again at month 3 with office visit.  Patient verbalized understanding and agrees with plan.  Next step, patient will call pre-service.

## 2023-09-22 ENCOUNTER — ESTABLISHED PATIENT (OUTPATIENT)
Dept: URBAN - METROPOLITAN AREA CLINIC 25 | Facility: CLINIC | Age: 63
End: 2023-09-22

## 2023-09-22 DIAGNOSIS — H52.223: ICD-10-CM

## 2023-09-22 PROCEDURE — 92014 COMPRE OPH EXAM EST PT 1/>: CPT

## 2023-09-22 PROCEDURE — 92499RS OCT RETINAL SCREENING, ELECTIVE

## 2023-09-22 PROCEDURE — 92015 DETERMINE REFRACTIVE STATE: CPT

## 2023-09-22 ASSESSMENT — KERATOMETRY
OS_AXISANGLE2_DEGREES: 90
OS_K2POWER_DIOPTERS: 45.00
OD_AXISANGLE2_DEGREES: 103
OD_AXISANGLE_DEGREES: 99
OD_AXISANGLE2_DEGREES: 9
OD_K2POWER_DIOPTERS: 45.00
OS_K1POWER_DIOPTERS: 44.25
OD_K1POWER_DIOPTERS: 44.50
OS_AXISANGLE_DEGREES: 145
OS_AXISANGLE2_DEGREES: 55
OD_AXISANGLE_DEGREES: 13
OS_K2POWER_DIOPTERS: 44.75
OS_K1POWER_DIOPTERS: 44.75
OS_AXISANGLE_DEGREES: 180

## 2023-09-22 ASSESSMENT — TONOMETRY
OD_IOP_MMHG: 13
OS_IOP_MMHG: 13

## 2023-09-22 ASSESSMENT — VISUAL ACUITY
OD_SC: 20/25
OS_SC: 20/25

## 2024-10-30 ENCOUNTER — COMPREHENSIVE EXAM (OUTPATIENT)
Dept: URBAN - METROPOLITAN AREA CLINIC 25 | Facility: CLINIC | Age: 64
End: 2024-10-30

## 2024-10-30 DIAGNOSIS — H52.223: ICD-10-CM

## 2024-10-30 DIAGNOSIS — Z96.1: ICD-10-CM

## 2024-10-30 DIAGNOSIS — H18.593: ICD-10-CM

## 2024-10-30 PROCEDURE — 92014 COMPRE OPH EXAM EST PT 1/>: CPT

## 2024-10-30 PROCEDURE — 92015 DETERMINE REFRACTIVE STATE: CPT

## 2024-10-30 PROCEDURE — 92499RS OCT RETINAL SCREENING, ELECTIVE
